# Patient Record
Sex: FEMALE | Race: WHITE
[De-identification: names, ages, dates, MRNs, and addresses within clinical notes are randomized per-mention and may not be internally consistent; named-entity substitution may affect disease eponyms.]

---

## 2017-01-30 ENCOUNTER — RX RENEWAL (OUTPATIENT)
Age: 57
End: 2017-01-30

## 2017-04-10 ENCOUNTER — APPOINTMENT (OUTPATIENT)
Dept: ENDOCRINOLOGY | Facility: CLINIC | Age: 57
End: 2017-04-10

## 2017-04-10 VITALS — SYSTOLIC BLOOD PRESSURE: 122 MMHG | DIASTOLIC BLOOD PRESSURE: 66 MMHG | HEART RATE: 72 BPM | OXYGEN SATURATION: 98 %

## 2017-04-10 VITALS — WEIGHT: 123 LBS | BODY MASS INDEX: 23.22 KG/M2 | HEIGHT: 60.9 IN

## 2017-08-16 ENCOUNTER — RX RENEWAL (OUTPATIENT)
Age: 57
End: 2017-08-16

## 2017-09-18 ENCOUNTER — RESULT REVIEW (OUTPATIENT)
Age: 57
End: 2017-09-18

## 2018-04-09 ENCOUNTER — APPOINTMENT (OUTPATIENT)
Dept: ENDOCRINOLOGY | Facility: CLINIC | Age: 58
End: 2018-04-09
Payer: COMMERCIAL

## 2018-04-09 VITALS
OXYGEN SATURATION: 98 % | WEIGHT: 126 LBS | HEIGHT: 60 IN | DIASTOLIC BLOOD PRESSURE: 80 MMHG | BODY MASS INDEX: 24.74 KG/M2 | SYSTOLIC BLOOD PRESSURE: 110 MMHG | HEART RATE: 75 BPM

## 2018-04-09 DIAGNOSIS — R73.09 OTHER ABNORMAL GLUCOSE: ICD-10-CM

## 2018-04-09 PROCEDURE — 99214 OFFICE O/P EST MOD 30 MIN: CPT

## 2018-04-09 RX ORDER — ALOGLIPTIN 25 MG/1
25 TABLET, FILM COATED ORAL
Qty: 90 | Refills: 0 | Status: DISCONTINUED | COMMUNITY
Start: 2017-01-18 | End: 2018-04-09

## 2018-09-18 ENCOUNTER — RX RENEWAL (OUTPATIENT)
Age: 58
End: 2018-09-18

## 2018-10-10 ENCOUNTER — RESULT REVIEW (OUTPATIENT)
Age: 58
End: 2018-10-10

## 2018-12-10 ENCOUNTER — RX RENEWAL (OUTPATIENT)
Age: 58
End: 2018-12-10

## 2019-03-15 ENCOUNTER — OTHER (OUTPATIENT)
Age: 59
End: 2019-03-15

## 2019-03-29 ENCOUNTER — RX RENEWAL (OUTPATIENT)
Age: 59
End: 2019-03-29

## 2019-07-01 ENCOUNTER — APPOINTMENT (OUTPATIENT)
Dept: ENDOCRINOLOGY | Facility: CLINIC | Age: 59
End: 2019-07-01
Payer: COMMERCIAL

## 2019-07-01 VITALS — SYSTOLIC BLOOD PRESSURE: 118 MMHG | DIASTOLIC BLOOD PRESSURE: 74 MMHG | HEART RATE: 92 BPM | OXYGEN SATURATION: 98 %

## 2019-07-01 VITALS — WEIGHT: 118 LBS | BODY MASS INDEX: 22.28 KG/M2 | HEIGHT: 61 IN

## 2019-07-01 PROCEDURE — 77080 DXA BONE DENSITY AXIAL: CPT

## 2019-07-01 PROCEDURE — ZZZZZ: CPT

## 2019-07-01 PROCEDURE — 99214 OFFICE O/P EST MOD 30 MIN: CPT | Mod: 25

## 2019-07-01 RX ORDER — TELMISARTAN 20 MG/1
20 TABLET ORAL
Qty: 90 | Refills: 0 | Status: ACTIVE | COMMUNITY
Start: 2019-06-10

## 2019-07-01 RX ORDER — PSYLLIUM HUSK 0.4 G
CAPSULE ORAL
Refills: 0 | Status: ACTIVE | COMMUNITY

## 2019-07-01 RX ORDER — EMPAGLIFLOZIN 25 MG/1
25 TABLET, FILM COATED ORAL
Refills: 0 | Status: DISCONTINUED | COMMUNITY
End: 2019-07-01

## 2019-07-01 RX ORDER — ROSUVASTATIN CALCIUM 5 MG/1
5 TABLET, FILM COATED ORAL
Qty: 90 | Refills: 0 | Status: ACTIVE | COMMUNITY
Start: 2019-06-28

## 2019-07-01 NOTE — PHYSICAL EXAM
[Alert] : alert [No Acute Distress] : no acute distress [Well Nourished] : well nourished [Well Developed] : well developed [Normal Sclera/Conjunctiva] : normal sclera/conjunctiva [EOMI] : extra ocular movement intact [No Proptosis] : no proptosis [Normal Oropharynx] : the oropharynx was normal [Thyroid Not Enlarged] : the thyroid was not enlarged [No Thyroid Nodules] : there were no palpable thyroid nodules [No Respiratory Distress] : no respiratory distress [No Accessory Muscle Use] : no accessory muscle use [Clear to Auscultation] : lungs were clear to auscultation bilaterally [Normal Rate] : heart rate was normal  [Normal S1, S2] : normal S1 and S2 [Regular Rhythm] : with a regular rhythm [No Edema] : there was no peripheral edema [Normal Bowel Sounds] : normal bowel sounds [Not Tender] : non-tender [Soft] : abdomen soft [Not Distended] : not distended [Anterior Cervical Nodes] : anterior cervical nodes [Normal] : normal and non tender [No Spinal Tenderness] : no spinal tenderness [Spine Straight] : spine straight [No Stigmata of Cushings Syndrome] : no stigmata of cushings syndrome [Normal Gait] : normal gait [Normal Strength/Tone] : muscle strength and tone were normal [No Rash] : no rash [Normal Reflexes] : deep tendon reflexes were 2+ and symmetric [No Tremors] : no tremors [Oriented x3] : oriented to person, place, and time [Acanthosis Nigricans] : no acanthosis nigricans

## 2019-07-01 NOTE — END OF VISIT
[FreeTextEntry3] : I, Ashley Chew, authored this note working as a medical scribe for Dr. Whittaker.  07/01/2019.  4:45PM.  This note was authored by the medical scribe for me. I have reviewed, edited, and revised the note as needed. I am in agreement with the exam findings, imaging findings, and treatment plan.  Cameron Whittaker MD

## 2019-07-01 NOTE — ASSESSMENT
[Bisphosphonate Therapy] : Risks  and benefits of bisphosphonate therapy were  discussed with the patient including gastroesophageal irritation, osteonecrosis of the jaw, and atypical femur fractures, and acute phase reaction [Bisphosphonates] : The patient was instructed to take bisphosphonates on an empty stomach with a full glass of water,and wait at least 30 minutes before eating or lying down [FreeTextEntry1] : 59 -year-old female  postmenopausal osteoporosis. \par \par 1. Osteoporosis Began alendronate 2016. Taking correctly, tolerating well. No interval fx, no UGI sx, no thigh pain. Last DDS within 1 month. No ONJ. Repeat BMD 4/2017 stable. BMD 7/2019 indicates stability in all sites. Lowest site is total hip (-2.6) consistent with osteoporosis. All other sites consistent with osteopenia.\par  I discussed how pt is able to begin a drug holiday to decrease risk of long term therapy side effects; ONJ or atypical femur fx. All questions answered, pt understands.  \par \par 2. Prediabetes: previous Hb A1c 6.2%. Pt previously took Metformin but did not tolerate. Was switched to Jardiance. Pt then switched again, currently taking Farxiga. Was previously recommended to take fish oil for elevated triglycerides, discussed that it might not be as effective with borderline triglyceride levels. \par \par Labs to sent. \par \par f/u one year.

## 2019-07-01 NOTE — HISTORY OF PRESENT ILLNESS
[Alendronate (Fosomax)] : Alendronate [Patient taking Meds Correctly] : Patient is taking meds correctly [FreeTextEntry1] : f/u 59 year-old female with osteoporosis.\par \par The patient has been in generally good health. She had routine bone density test which showed decreasing values. Spine -1.4, total hip -2.5, femoral neck -1.7, all decreased versus 2013. Patient has suffered no fx. She has no unusual risk factors for osteoporosis. She began alendronate 3/2016 , taking correctly, tolerating well. No interval fx. Last DDS within 1 months. Repeat BMD 4/2017 stable. \par   \par Pt has h/o prediabetes.  A1c 6.2%, pt tried metformin, didn't tolerate. Changed to Jardiance, managed by primary care. Pt then changed to Farxiga for prediabetes management. Currently taking fish oil as well due to increased triglyceride levels taken 2 weeks ago.  [Disordered Eating] : no past or present history of disordered eating [Taking Steroids] : no past or present history of taking steroids [Kidney Stones] : no history of kidney stones [Family History of Osteoporosis] : no family history of osteoporosis [Family History of Breast Cancer] : no family history of breast cancer [Family History of Hip Fracture] : no family history of hip fracture [Hyperparathyroidism] : no hyperparathyroidism [History of Radiation Therapy] : no history of radiation therapy [History of Blood Clots] : no history of blood clots [Previous Fragility Fracture] : no previous fragility fracture

## 2019-07-01 NOTE — PROCEDURE
[FreeTextEntry1] : Bone mineral density: 07/01/2019 \par Indication: vs 2017 assess response to medication \par Spine: -1.1, osteopenia (+2.6%)  \par Total hip: -2.6, osteoporosis, no significant change \par Femoral neck: -2.3, osteopenia no significant change \par Proximal radius: - 1.8, osteopenia, no significant change \par \par Bone mineral density April 10, 2017\par Indication assess response to medication\par Spine was 1.3, osteopenia, no significant change\par Total hip - 2.7, Osteoporosis, no significant change\par Femoral neck - 2.4, osteopenia, no significant change\par Proximal radius -1.5, osteopenia no prior\par \par Repeat bone density of hip at patient request, no charge\par Total hip -2.6, osteoporosis\par Femoral neck -2.3, osteopenia

## 2019-10-07 ENCOUNTER — TRANSCRIPTION ENCOUNTER (OUTPATIENT)
Age: 59
End: 2019-10-07

## 2019-10-23 ENCOUNTER — RESULT REVIEW (OUTPATIENT)
Age: 59
End: 2019-10-23

## 2020-07-06 ENCOUNTER — APPOINTMENT (OUTPATIENT)
Dept: ENDOCRINOLOGY | Facility: CLINIC | Age: 60
End: 2020-07-06
Payer: COMMERCIAL

## 2020-07-06 VITALS
DIASTOLIC BLOOD PRESSURE: 60 MMHG | WEIGHT: 122 LBS | OXYGEN SATURATION: 98 % | TEMPERATURE: 97.7 F | HEIGHT: 60.7 IN | BODY MASS INDEX: 23.33 KG/M2 | SYSTOLIC BLOOD PRESSURE: 110 MMHG | HEART RATE: 73 BPM

## 2020-07-06 PROCEDURE — 77080 DXA BONE DENSITY AXIAL: CPT

## 2020-07-06 PROCEDURE — ZZZZZ: CPT

## 2020-07-06 PROCEDURE — 99214 OFFICE O/P EST MOD 30 MIN: CPT

## 2020-07-06 RX ORDER — EMPAGLIFLOZIN 25 MG/1
25 TABLET, FILM COATED ORAL
Refills: 0 | Status: ACTIVE | COMMUNITY

## 2020-07-06 RX ORDER — DAPAGLIFLOZIN 10 MG/1
10 TABLET, FILM COATED ORAL
Qty: 90 | Refills: 0 | Status: DISCONTINUED | COMMUNITY
Start: 2019-06-17 | End: 2020-07-06

## 2020-07-06 RX ORDER — UBIDECARENONE/VIT E ACET 100MG-5
50 MCG CAPSULE ORAL
Refills: 0 | Status: ACTIVE | COMMUNITY

## 2020-07-06 NOTE — ASSESSMENT
[Bisphosphonate Therapy] : Risks  and benefits of bisphosphonate therapy were  discussed with the patient including gastroesophageal irritation, osteonecrosis of the jaw, and atypical femur fractures, and acute phase reaction [Bisphosphonates] : The patient was instructed to take bisphosphonates on an empty stomach with a full glass of water,and wait at least 30 minutes before eating or lying down [FreeTextEntry1] : 60 -year-old female  postmenopausal osteoporosis. \par \par 1. Osteoporosis Began alendronate 2016\par Can drug holiday 2019.  No interval fractures.  Bone mineral density slightly decreased in the spine but still osteopenia total hip stable femoral neck decreased but trochanter increase to same extent.\par Patient reluctant to restart medication until she completes adult orthodontia.  Continue drug holiday follow-up 1 year with repeat bone density\par . \par  \par 2. Prediabetes:   Hb A1c 6.2%. Pt previously took Metformin but did not tolerate. On  Jardiance.for prevention   Was previously recommended to take fish oil for elevated triglycerides, discussed that it might not be as effective with borderline triglyceride levels. \par \par  3. Hypertension: Blood pressure well controlled on current medication. \par \par f/u one year.

## 2020-07-06 NOTE — PROCEDURE
[FreeTextEntry1] : Bone mineral density July 6, 2020\par Compared to 2019 assess response to change in medication\par Spine -1.4 osteopenia -3.3%\par Total hip -2.6 osteoporosis no significant change\par Femoral neck -2.6 osteoporosis -5.7%\par Trochanter -2.5 osteoporosis +6.3%\par Proximal radius -1.7 osteopenia no significant change\par \par Bone mineral density: 07/01/2019 \par Indication: vs 2017 assess response to medication \par Spine: -1.1, osteopenia (+2.6%)  \par Total hip: -2.6, osteoporosis, no significant change \par Femoral neck: -2.3, osteopenia no significant change \par Proximal radius: - 1.8, osteopenia, no significant change \par \par Bone mineral density April 10, 2017\par Indication assess response to medication\par Spine was 1.3, osteopenia, no significant change\par Total hip - 2.7, Osteoporosis, no significant change\par Femoral neck - 2.4, osteopenia, no significant change\par Proximal radius -1.5, osteopenia no prior\par \par Repeat bone density of hip at patient request, no charge\par Total hip -2.6, osteoporosis\par Femoral neck -2.3, osteopenia

## 2020-07-06 NOTE — HISTORY OF PRESENT ILLNESS
[Alendronate (Fosomax)] : Alendronate [Patient taking Meds Correctly] : Patient is taking meds correctly [FreeTextEntry1] : .Began drug holiday from alendronate 2019. No interval fx. \par Concerned about adult orthodontia and use of osteoporosis meds\par The patient has been in generally good health. She had routine bone density test which showed decreasing values. Spine -1.4, total hip -2.5, femoral neck -1.7, all decreased versus 2013. Patient has suffered no fx. She has no unusual risk factors for osteoporosis. She began alendronate 3/2016 , taking correctly, tolerating well. No interval fx. Last DDS within 1 months. Repeat BMD 4/2017 stable. \par   Taking > 1000 mg /d of calcium\par Pt has h/o prediabetes.  A1c 6.2%, pt tried metformin, didn't tolerate. Changed to Jardiance, managed by primary care. . for prediabetes management. Currently taking fish oil as well due to increased triglyceride levels taken 2 weeks ago.  [Disordered Eating] : no past or present history of disordered eating [Taking Steroids] : no past or present history of taking steroids [Kidney Stones] : no history of kidney stones [Family History of Breast Cancer] : no family history of breast cancer [Family History of Hip Fracture] : no family history of hip fracture [Family History of Osteoporosis] : no family history of osteoporosis [History of Radiation Therapy] : no history of radiation therapy [Hyperparathyroidism] : no hyperparathyroidism [Previous Fragility Fracture] : no previous fragility fracture [History of Blood Clots] : no history of blood clots

## 2020-07-06 NOTE — PHYSICAL EXAM
[Well Nourished] : well nourished [Alert] : alert [Well Developed] : well developed [No Acute Distress] : no acute distress [EOMI] : extra ocular movement intact [Normal Sclera/Conjunctiva] : normal sclera/conjunctiva [No Proptosis] : no proptosis [Normal Oropharynx] : the oropharynx was normal [No Thyroid Nodules] : no palpable thyroid nodules [Thyroid Not Enlarged] : the thyroid was not enlarged [No Respiratory Distress] : no respiratory distress [Normal S1, S2] : normal S1 and S2 [No Accessory Muscle Use] : no accessory muscle use [Clear to Auscultation] : lungs were clear to auscultation bilaterally [Normal Rate] : heart rate was normal [Regular Rhythm] : with a regular rhythm [Normal Bowel Sounds] : normal bowel sounds [No Edema] : no peripheral edema [Soft] : abdomen soft [Not Distended] : not distended [Not Tender] : non-tender [Normal Anterior Cervical Nodes] : no anterior cervical lymphadenopathy [Normal Posterior Cervical Nodes] : no posterior cervical lymphadenopathy [No Spinal Tenderness] : no spinal tenderness [Spine Straight] : spine straight [Normal Strength/Tone] : muscle strength and tone were normal [No Stigmata of Cushings Syndrome] : no stigmata of Cushings Syndrome [Normal Gait] : normal gait [No Rash] : no rash [No Tremors] : no tremors [Normal Reflexes] : deep tendon reflexes were 2+ and symmetric [Acanthosis Nigricans] : no acanthosis nigricans [Oriented x3] : oriented to person, place, and time

## 2020-10-26 ENCOUNTER — RESULT REVIEW (OUTPATIENT)
Age: 60
End: 2020-10-26

## 2021-07-19 ENCOUNTER — APPOINTMENT (OUTPATIENT)
Dept: ENDOCRINOLOGY | Facility: CLINIC | Age: 61
End: 2021-07-19
Payer: COMMERCIAL

## 2021-07-19 VITALS
BODY MASS INDEX: 23.33 KG/M2 | OXYGEN SATURATION: 98 % | TEMPERATURE: 97.6 F | HEIGHT: 60.7 IN | DIASTOLIC BLOOD PRESSURE: 70 MMHG | WEIGHT: 122 LBS | SYSTOLIC BLOOD PRESSURE: 126 MMHG | HEART RATE: 71 BPM

## 2021-07-19 DIAGNOSIS — I10 ESSENTIAL (PRIMARY) HYPERTENSION: ICD-10-CM

## 2021-07-19 PROCEDURE — 99214 OFFICE O/P EST MOD 30 MIN: CPT | Mod: 25

## 2021-07-19 PROCEDURE — 77080 DXA BONE DENSITY AXIAL: CPT

## 2021-07-19 PROCEDURE — 99072 ADDL SUPL MATRL&STAF TM PHE: CPT

## 2021-07-19 PROCEDURE — ZZZZZ: CPT

## 2021-07-19 NOTE — PHYSICAL EXAM
[Alert] : alert [Well Nourished] : well nourished [No Acute Distress] : no acute distress [Well Developed] : well developed [Normal Sclera/Conjunctiva] : normal sclera/conjunctiva [EOMI] : extra ocular movement intact [No Proptosis] : no proptosis [Thyroid Not Enlarged] : the thyroid was not enlarged [No Thyroid Nodules] : no palpable thyroid nodules [Clear to Auscultation] : lungs were clear to auscultation bilaterally [Normal S1, S2] : normal S1 and S2 [Normal Rate] : heart rate was normal [Regular Rhythm] : with a regular rhythm [No Edema] : no peripheral edema [Normal Bowel Sounds] : normal bowel sounds [Not Tender] : non-tender [Not Distended] : not distended [Soft] : abdomen soft [Normal Anterior Cervical Nodes] : no anterior cervical lymphadenopathy [No Spinal Tenderness] : no spinal tenderness [Spine Straight] : spine straight [No Stigmata of Cushings Syndrome] : no stigmata of Cushings Syndrome [Normal Gait] : normal gait [Normal Reflexes] : deep tendon reflexes were 2+ and symmetric [No Tremors] : no tremors [Oriented x3] : oriented to person, place, and time [de-identified] : right upper rear molar extraction site healed

## 2021-07-19 NOTE — ASSESSMENT
[Bisphosphonate Therapy] : Risks and benefits of bisphosphonate therapy were  discussed with the patient including gastroesophageal irritation, osteonecrosis of the jaw, and atypical femur fractures, and acute phase reaction [Bisphosphonates] : The patient was instructed to take bisphosphonates on an empty stomach with a full glass of water,and wait at least 30 minutes before eating or lying down [FreeTextEntry1] : 61 year-old female postmenopausal osteoporosis. \par \par 1. Osteoporosis Began alendronate 2016. Tolerated well. On drug holiday since 2019. No interval fractures. BMD 7/2020 slightly decreased in the spine but still osteopenia total hip stable femoral neck decreased but trochanter increase to same extent. Pt elected to delay treatment until orthodontia was completed at that time. BMD 7/2021 indicates stable osteopenia in spine and proximal radius, stable osteoporosis in total hip, but worsened low osteoporosis in femoral neck. BMD results reviewed w/ pt.\par \par Options of therapy reviewed. Pt will have dental extraction around 8/2021. Recommend restarting alendronate 1-2 months after dental work is completed to decrease risk of ONJ. Medication instructions reviewed. Prescription sent.\par \par 2. Prediabetes: A1c 5.8%. Pt previously took Metformin but did not tolerate. On Jardiance for prevention. \par \par 3. Hypertension: Blood pressure well controlled on current medication. \par \par Labs 4/2021 reviewed: Ca 9.2, normal. PTH, normal. Vitamin D 38, normal. TSH 2.10, normal. Free T4, normal. Creatinine, normal. A1c 5.8%, prediabetes.\par \par F/u in 1 year after restarting rx w/ BMD

## 2021-07-19 NOTE — PROCEDURE
[FreeTextEntry1] : Bone mineral density: 07/19/2021 \par Indication: vs. 2020 prior test showed bone loss\par Spine: -1.3 osteopenia, no significant change\par Total hip: -2.8 osteoporosis, no significant change\par Femoral neck: -3.1 osteoporosis, -9.8%\par Proximal radius: -1.8 osteopenia, no significant change\par \par Bone mineral density July 6, 2020\par Compared to 2019 assess response to change in medication\par Spine -1.4 osteopenia -3.3%\par Total hip -2.6 osteoporosis no significant change\par Femoral neck -2.6 osteoporosis -5.7%\par Trochanter -2.5 osteoporosis +6.3%\par Proximal radius -1.7 osteopenia no significant change\par \par Bone mineral density: 07/01/2019 \par Indication: vs 2017 assess response to medication \par Spine: -1.1, osteopenia (+2.6%)  \par Total hip: -2.6, osteoporosis, no significant change \par Femoral neck: -2.3, osteopenia no significant change \par Proximal radius: - 1.8, osteopenia, no significant change \par \par Bone mineral density April 10, 2017\par Indication assess response to medication\par Spine was 1.3, osteopenia, no significant change\par Total hip - 2.7, Osteoporosis, no significant change\par Femoral neck - 2.4, osteopenia, no significant change\par Proximal radius -1.5, osteopenia no prior\par \par Repeat bone density of hip at patient request, no charge\par Total hip -2.6, osteoporosis\par Femoral neck -2.3, osteopenia

## 2021-07-19 NOTE — HISTORY OF PRESENT ILLNESS
[Alendronate (Fosomax)] : Alendronate [FreeTextEntry1] : No significant interval health changes. No interval surgery, hospitalizations, fractures, or change in medications.\par \par Began drug holiday from alendronate 2019. No interval fx. \par Concerned about adult orthodontia and use of osteoporosis meds\par The patient has been in generally good health. She had routine bone density test which showed decreasing values. Spine -1.4, total hip -2.5, femoral neck -1.7, all decreased versus 2013. Patient has suffered no fx. She has no unusual risk factors for osteoporosis. She began alendronate 3/2016. Took correctly. Repeat BMD 4/2017 stable. MD 7/2020 slightly decreased in the spine but still osteopenia total hip stable femoral neck decreased but trochanter increase to same extent. Pt elected to delay treatment until orthodontia was completed at that time. \par  \par Pt has h/o prediabetes. Last A1c 6.2%, pt tried metformin, didn't tolerate. Changed to Jardiance, managed by primary care. for prediabetes management. Currently taking fish oil as well due to increased triglyceride levels taken 2 weeks ago.

## 2021-10-27 ENCOUNTER — RESULT REVIEW (OUTPATIENT)
Age: 61
End: 2021-10-27

## 2022-09-12 ENCOUNTER — APPOINTMENT (OUTPATIENT)
Dept: ENDOCRINOLOGY | Facility: CLINIC | Age: 62
End: 2022-09-12

## 2022-10-24 ENCOUNTER — APPOINTMENT (OUTPATIENT)
Dept: ENDOCRINOLOGY | Facility: CLINIC | Age: 62
End: 2022-10-24

## 2022-10-24 VITALS
HEIGHT: 60.3 IN | WEIGHT: 127 LBS | OXYGEN SATURATION: 98 % | DIASTOLIC BLOOD PRESSURE: 70 MMHG | TEMPERATURE: 97.9 F | SYSTOLIC BLOOD PRESSURE: 120 MMHG | BODY MASS INDEX: 24.61 KG/M2 | HEART RATE: 78 BPM

## 2022-10-24 PROCEDURE — ZZZZZ: CPT

## 2022-10-24 PROCEDURE — 99214 OFFICE O/P EST MOD 30 MIN: CPT | Mod: 25

## 2022-10-24 PROCEDURE — 77080 DXA BONE DENSITY AXIAL: CPT

## 2022-10-25 NOTE — PHYSICAL EXAM
[Alert] : alert [Well Nourished] : well nourished [No Acute Distress] : no acute distress [Well Developed] : well developed [Normal Sclera/Conjunctiva] : normal sclera/conjunctiva [EOMI] : extra ocular movement intact [No Proptosis] : no proptosis [Thyroid Not Enlarged] : the thyroid was not enlarged [No Thyroid Nodules] : no palpable thyroid nodules [Clear to Auscultation] : lungs were clear to auscultation bilaterally [Normal S1, S2] : normal S1 and S2 [Normal Rate] : heart rate was normal [Regular Rhythm] : with a regular rhythm [No Edema] : no peripheral edema [Normal Bowel Sounds] : normal bowel sounds [Not Tender] : non-tender [Not Distended] : not distended [Soft] : abdomen soft [Normal Anterior Cervical Nodes] : no anterior cervical lymphadenopathy [No Spinal Tenderness] : no spinal tenderness [Spine Straight] : spine straight [No Stigmata of Cushings Syndrome] : no stigmata of Cushings Syndrome [Normal Gait] : normal gait [Normal Reflexes] : deep tendon reflexes were 2+ and symmetric [No Tremors] : no tremors [Oriented x3] : oriented to person, place, and time [de-identified] : right upper rear molar extraction site healed

## 2022-10-25 NOTE — HISTORY OF PRESENT ILLNESS
[Alendronate (Fosomax)] : Alendronate [FreeTextEntry1] : Patient returns for a follow up visit for osteoporosis. Since the last visit pt has no significant interval health changes. No interval surgery, hospitalizations, fractures, or change in medications.\par \par Began drug holiday from alendronate 2019. Restarted 2021 No interval fx. \par Concerned about adult orthodontia and use of osteoporosis meds\par The patient has been in generally good health. She had routine bone density test which showed decreasing values. Spine -1.4, total hip -2.5, femoral neck -1.7, all decreased versus 2013. Patient has suffered no fx. She has no unusual risk factors for osteoporosis. She began alendronate 3/2016. Took correctly. Repeat BMD 4/2017 stable. MD 7/2020 slightly decreased in the spine but still osteopenia total hip stable femoral neck decreased but trochanter increase to same extent. Pt elected to delay treatment until orthodontia was completed at that time. Pt began alendronate July 2021 , taking correctly tolerating well. \par  \par Pt has h/o prediabetes. Last A1c 6.2%, pt tried metformin, didn't tolerate. Changed to Jardiance, managed by primary care. for prediabetes management. Currently taking fish oil as well due to increased triglyceride levels taken 2 weeks ago.

## 2022-10-25 NOTE — ASSESSMENT
[Bisphosphonate Therapy] : Risks and benefits of bisphosphonate therapy were  discussed with the patient including gastroesophageal irritation, osteonecrosis of the jaw, and atypical femur fractures, and acute phase reaction [Bisphosphonates] : The patient was instructed to take bisphosphonates on an empty stomach with a full glass of water,and wait at least 30 minutes before eating or lying down [FreeTextEntry1] : 62 year-old female postmenopausal osteoporosis. \par \par 1. Osteoporosis Began alendronate 2016. Tolerated well. On drug holiday since 2019. No interval fractures. BMD 7/2020 slightly decreased in the spine but still osteopenia total hip stable femoral neck decreased but trochanter increase to same extent. Pt elected to delay treatment until orthodontia was completed at that time. BMD 7/2021 indicates stable osteopenia in spine and proximal radius, stable osteoporosis in total hip, but worsened low osteoporosis in femoral neck. Pt began alendronate July 2021 , taking correctly tolerating well.  BMD 10/2022 shows thaT bone density has had minimal improvement.  BMD results reviewed w/ pt.\par \par 2. Prediabetes: A1c 6.4%. Pt previously took Metformin but did not tolerate. On Jardiance for prevention. \par \par 3. Hypertension: Blood pressure well controlled on current medication. \par \par Labs\par Calcium 9.3\par A1C 6.4\par Vitamin D 55\par TSH 2.18\par  \par Follow up in 1 year w BMD\par

## 2022-10-25 NOTE — END OF VISIT
[FreeTextEntry3] : This note was written by Destiny Beyer on ( October 24, 2022) acting as a medical scribe for Dr. Whittaker  This note was authored by the medical scribe for me. I have reviewed, edited, and revised the note as needed. I am in agreement with the exam findings, imaging findings, and treatment plan.  Cameron Whittaker MD

## 2022-10-25 NOTE — PROCEDURE
[FreeTextEntry1] : Bone Density 10/24/2022\par Indication vs 2021 asess response to medication \par Spine-1.3 osteopenia , no significant change \par Total Hip-2.9 osteoporosis  no significant change \par Femoral Neck -2.9 osteoporosis +4%\par Proximal Radius -2.1 osteopenia no significant change \par \par Bone mineral density: 07/19/2021 \par Indication: vs. 2020 prior test showed bone loss\par Spine: -1.3 osteopenia, no significant change\par Total hip: -2.8 osteoporosis, no significant change\par Femoral neck: -3.1 osteoporosis, -9.8%\par Proximal radius: -1.8 osteopenia, no significant change\par \par Bone mineral density July 6, 2020\par Compared to 2019 assess response to change in medication\par Spine -1.4 osteopenia -3.3%\par Total hip -2.6 osteoporosis no significant change\par Femoral neck -2.6 osteoporosis -5.7%\par Trochanter -2.5 osteoporosis +6.3%\par Proximal radius -1.7 osteopenia no significant change\par \par Bone mineral density: 07/01/2019 \par Indication: vs 2017 assess response to medication \par Spine: -1.1, osteopenia (+2.6%)  \par Total hip: -2.6, osteoporosis, no significant change \par Femoral neck: -2.3, osteopenia no significant change \par Proximal radius: - 1.8, osteopenia, no significant change \par \par Bone mineral density April 10, 2017\par Indication assess response to medication\par Spine was 1.3, osteopenia, no significant change\par Total hip - 2.7, Osteoporosis, no significant change\par Femoral neck - 2.4, osteopenia, no significant change\par Proximal radius -1.5, osteopenia no prior\par \par Repeat bone density of hip at patient request, no charge\par Total hip -2.6, osteoporosis\par Femoral neck -2.3, osteopenia

## 2022-11-02 ENCOUNTER — RESULT REVIEW (OUTPATIENT)
Age: 62
End: 2022-11-02

## 2023-07-07 ENCOUNTER — NON-APPOINTMENT (OUTPATIENT)
Age: 63
End: 2023-07-07

## 2023-08-29 ENCOUNTER — RX RENEWAL (OUTPATIENT)
Age: 63
End: 2023-08-29

## 2023-10-27 ENCOUNTER — NON-APPOINTMENT (OUTPATIENT)
Age: 63
End: 2023-10-27

## 2023-11-20 ENCOUNTER — APPOINTMENT (OUTPATIENT)
Dept: ENDOCRINOLOGY | Facility: CLINIC | Age: 63
End: 2023-11-20
Payer: COMMERCIAL

## 2023-11-20 VITALS — WEIGHT: 126 LBS | BODY MASS INDEX: 24.1 KG/M2 | HEIGHT: 60.5 IN

## 2023-11-20 VITALS — SYSTOLIC BLOOD PRESSURE: 120 MMHG | OXYGEN SATURATION: 98 % | DIASTOLIC BLOOD PRESSURE: 80 MMHG | HEART RATE: 85 BPM

## 2023-11-20 DIAGNOSIS — R73.03 PREDIABETES.: ICD-10-CM

## 2023-11-20 DIAGNOSIS — M81.0 AGE-RELATED OSTEOPOROSIS W/OUT CURRENT PATHOLOGICAL FRACTURE: ICD-10-CM

## 2023-11-20 PROCEDURE — 99213 OFFICE O/P EST LOW 20 MIN: CPT | Mod: 25

## 2023-11-20 PROCEDURE — 77080 DXA BONE DENSITY AXIAL: CPT

## 2023-11-20 PROCEDURE — ZZZZZ: CPT

## 2023-11-21 PROBLEM — R73.03 PREDIABETES: Status: ACTIVE | Noted: 2020-07-06

## 2024-01-02 ENCOUNTER — APPOINTMENT (OUTPATIENT)
Dept: OPHTHALMOLOGY | Facility: CLINIC | Age: 64
End: 2024-01-02
Payer: COMMERCIAL

## 2024-01-02 ENCOUNTER — NON-APPOINTMENT (OUTPATIENT)
Age: 64
End: 2024-01-02

## 2024-01-02 PROCEDURE — 99204 OFFICE O/P NEW MOD 45 MIN: CPT

## 2024-04-30 ENCOUNTER — APPOINTMENT (OUTPATIENT)
Dept: OPHTHALMOLOGY | Facility: CLINIC | Age: 64
End: 2024-04-30

## 2024-05-01 RX ORDER — ALENDRONATE SODIUM 70 MG/1
70 TABLET ORAL
Qty: 12 | Refills: 3 | Status: ACTIVE | COMMUNITY
Start: 2021-07-19 | End: 1900-01-01

## 2024-08-12 ENCOUNTER — NON-APPOINTMENT (OUTPATIENT)
Age: 64
End: 2024-08-12

## 2024-09-06 ENCOUNTER — TRANSCRIPTION ENCOUNTER (OUTPATIENT)
Age: 64
End: 2024-09-06

## 2024-09-06 ENCOUNTER — APPOINTMENT (OUTPATIENT)
Dept: VASCULAR SURGERY | Facility: CLINIC | Age: 64
End: 2024-09-06
Payer: COMMERCIAL

## 2024-09-06 ENCOUNTER — NON-APPOINTMENT (OUTPATIENT)
Age: 64
End: 2024-09-06

## 2024-09-06 VITALS
HEART RATE: 90 BPM | DIASTOLIC BLOOD PRESSURE: 86 MMHG | SYSTOLIC BLOOD PRESSURE: 114 MMHG | HEIGHT: 60.5 IN | BODY MASS INDEX: 24.67 KG/M2 | WEIGHT: 129 LBS | TEMPERATURE: 98.3 F

## 2024-09-06 VITALS — DIASTOLIC BLOOD PRESSURE: 84 MMHG | HEART RATE: 97 BPM | SYSTOLIC BLOOD PRESSURE: 128 MMHG

## 2024-09-06 PROCEDURE — 99203 OFFICE O/P NEW LOW 30 MIN: CPT

## 2024-09-06 RX ORDER — EZETIMIBE 10 MG/1
TABLET ORAL
Refills: 0 | Status: ACTIVE | COMMUNITY

## 2024-09-07 ENCOUNTER — NON-APPOINTMENT (OUTPATIENT)
Age: 64
End: 2024-09-07

## 2024-11-20 ENCOUNTER — APPOINTMENT (OUTPATIENT)
Dept: ENDOCRINOLOGY | Facility: CLINIC | Age: 64
End: 2024-11-20
Payer: COMMERCIAL

## 2024-11-20 VITALS — WEIGHT: 127 LBS | HEIGHT: 60.5 IN | BODY MASS INDEX: 24.29 KG/M2

## 2024-11-20 VITALS
BODY MASS INDEX: 24.29 KG/M2 | HEIGHT: 60.5 IN | WEIGHT: 127 LBS | HEART RATE: 87 BPM | OXYGEN SATURATION: 97 % | SYSTOLIC BLOOD PRESSURE: 124 MMHG | DIASTOLIC BLOOD PRESSURE: 80 MMHG

## 2024-11-20 DIAGNOSIS — R73.03 PREDIABETES.: ICD-10-CM

## 2024-11-20 DIAGNOSIS — M81.0 AGE-RELATED OSTEOPOROSIS W/OUT CURRENT PATHOLOGICAL FRACTURE: ICD-10-CM

## 2024-11-20 DIAGNOSIS — E11.9 TYPE 2 DIABETES MELLITUS W/OUT COMPLICATIONS: ICD-10-CM

## 2024-11-20 DIAGNOSIS — R73.09 OTHER ABNORMAL GLUCOSE: ICD-10-CM

## 2024-11-20 PROCEDURE — ZZZZZ: CPT

## 2024-11-20 PROCEDURE — 77080 DXA BONE DENSITY AXIAL: CPT

## 2024-11-20 PROCEDURE — 99214 OFFICE O/P EST MOD 30 MIN: CPT

## 2024-11-26 ENCOUNTER — NON-APPOINTMENT (OUTPATIENT)
Age: 64
End: 2024-11-26

## 2024-11-26 ENCOUNTER — LABORATORY RESULT (OUTPATIENT)
Age: 64
End: 2024-11-26

## 2024-11-26 ENCOUNTER — APPOINTMENT (OUTPATIENT)
Facility: CLINIC | Age: 64
End: 2024-11-26

## 2024-11-26 VITALS — DIASTOLIC BLOOD PRESSURE: 64 MMHG | SYSTOLIC BLOOD PRESSURE: 102 MMHG

## 2024-11-26 DIAGNOSIS — Z92.89 PERSONAL HISTORY OF OTHER MEDICAL TREATMENT: ICD-10-CM

## 2024-11-26 DIAGNOSIS — Z98.890 OTHER SPECIFIED POSTPROCEDURAL STATES: ICD-10-CM

## 2024-11-26 PROCEDURE — 99459 PELVIC EXAMINATION: CPT

## 2024-11-26 PROCEDURE — 99214 OFFICE O/P EST MOD 30 MIN: CPT

## 2024-11-26 PROCEDURE — 82270 OCCULT BLOOD FECES: CPT
